# Patient Record
Sex: FEMALE | Race: WHITE | NOT HISPANIC OR LATINO | URBAN - METROPOLITAN AREA
[De-identification: names, ages, dates, MRNs, and addresses within clinical notes are randomized per-mention and may not be internally consistent; named-entity substitution may affect disease eponyms.]

---

## 2017-05-13 ENCOUNTER — GENERIC CONVERSION - ENCOUNTER (OUTPATIENT)
Dept: OTHER | Facility: OTHER | Age: 42
End: 2017-05-13

## 2017-06-08 ENCOUNTER — GENERIC CONVERSION - ENCOUNTER (OUTPATIENT)
Dept: OTHER | Facility: OTHER | Age: 42
End: 2017-06-08

## 2017-06-22 ENCOUNTER — GENERIC CONVERSION - ENCOUNTER (OUTPATIENT)
Dept: OTHER | Facility: OTHER | Age: 42
End: 2017-06-22

## 2017-07-10 ENCOUNTER — GENERIC CONVERSION - ENCOUNTER (OUTPATIENT)
Dept: OTHER | Facility: OTHER | Age: 42
End: 2017-07-10

## 2017-08-03 ENCOUNTER — GENERIC CONVERSION - ENCOUNTER (OUTPATIENT)
Dept: OTHER | Facility: OTHER | Age: 42
End: 2017-08-03

## 2017-08-14 ENCOUNTER — GENERIC CONVERSION - ENCOUNTER (OUTPATIENT)
Dept: OTHER | Facility: OTHER | Age: 42
End: 2017-08-14

## 2017-09-07 ENCOUNTER — GENERIC CONVERSION - ENCOUNTER (OUTPATIENT)
Dept: OTHER | Facility: OTHER | Age: 42
End: 2017-09-07

## 2017-09-11 ENCOUNTER — GENERIC CONVERSION - ENCOUNTER (OUTPATIENT)
Dept: OTHER | Facility: OTHER | Age: 42
End: 2017-09-11

## 2017-09-27 ENCOUNTER — GENERIC CONVERSION - ENCOUNTER (OUTPATIENT)
Dept: OTHER | Facility: OTHER | Age: 42
End: 2017-09-27

## 2019-05-24 ENCOUNTER — OFFICE VISIT (OUTPATIENT)
Dept: GASTROENTEROLOGY | Facility: CLINIC | Age: 44
End: 2019-05-24
Payer: COMMERCIAL

## 2019-05-24 VITALS
BODY MASS INDEX: 23.32 KG/M2 | HEIGHT: 65 IN | DIASTOLIC BLOOD PRESSURE: 78 MMHG | SYSTOLIC BLOOD PRESSURE: 108 MMHG | WEIGHT: 140 LBS | HEART RATE: 72 BPM

## 2019-05-24 DIAGNOSIS — R10.32 LEFT LOWER QUADRANT PAIN: Primary | ICD-10-CM

## 2019-05-24 DIAGNOSIS — Z80.0 FAMILY HISTORY OF COLON CANCER IN MOTHER: ICD-10-CM

## 2019-05-24 PROCEDURE — 99203 OFFICE O/P NEW LOW 30 MIN: CPT | Performed by: INTERNAL MEDICINE

## 2019-05-24 RX ORDER — NORETHINDRONE ACETATE/ETHINYL ESTRADIOL AND FERROUS FUMARATE 1.5-30(21)
KIT ORAL
Refills: 0 | COMMUNITY
Start: 2019-05-08

## 2019-05-24 RX ORDER — HYOSCYAMINE SULFATE 0.125 MG
0.12 TABLET ORAL EVERY 6 HOURS PRN
Qty: 30 TABLET | Refills: 2 | Status: SHIPPED | OUTPATIENT
Start: 2019-05-24 | End: 2019-08-27 | Stop reason: SDUPTHER

## 2019-05-24 RX ORDER — LEVOCETIRIZINE DIHYDROCHLORIDE 5 MG/1
5 TABLET, FILM COATED ORAL DAILY PRN
COMMUNITY

## 2019-05-24 RX ORDER — OMEGA-3 FATTY ACIDS/FISH OIL 300-1000MG
CAPSULE ORAL
COMMUNITY

## 2019-06-05 LAB
ALBUMIN SERPL-MCNC: 3.9 G/DL (ref 3.6–5.1)
ALBUMIN/GLOB SERPL: 1.4 (CALC) (ref 1–2.5)
ALP SERPL-CCNC: 32 U/L (ref 33–115)
ALT SERPL-CCNC: 10 U/L (ref 6–29)
AST SERPL-CCNC: 17 U/L (ref 10–35)
BASOPHILS # BLD AUTO: 38 CELLS/UL (ref 0–200)
BASOPHILS NFR BLD AUTO: 0.5 %
BILIRUB SERPL-MCNC: 0.5 MG/DL (ref 0.2–1.2)
BUN SERPL-MCNC: 9 MG/DL (ref 7–25)
BUN/CREAT SERPL: ABNORMAL (CALC) (ref 6–22)
CALCIUM SERPL-MCNC: 9.1 MG/DL (ref 8.6–10.2)
CHLORIDE SERPL-SCNC: 105 MMOL/L (ref 98–110)
CO2 SERPL-SCNC: 26 MMOL/L (ref 20–32)
CREAT SERPL-MCNC: 0.83 MG/DL (ref 0.5–1.1)
EOSINOPHIL # BLD AUTO: 90 CELLS/UL (ref 15–500)
EOSINOPHIL NFR BLD AUTO: 1.2 %
ERYTHROCYTE [DISTWIDTH] IN BLOOD BY AUTOMATED COUNT: 12 % (ref 11–15)
ERYTHROCYTE [SEDIMENTATION RATE] IN BLOOD BY WESTERGREN METHOD: 6 MM/H
GLIADIN IGA SER IA-ACNC: 3 U
GLIADIN IGA SER IA-ACNC: 6 U
GLOBULIN SER CALC-MCNC: 2.8 G/DL (CALC) (ref 1.9–3.7)
GLUCOSE SERPL-MCNC: 94 MG/DL (ref 65–99)
HCT VFR BLD AUTO: 39.9 % (ref 35–45)
HGB BLD-MCNC: 13.5 G/DL (ref 11.7–15.5)
IGA SERPL-MCNC: 283 MG/DL (ref 81–463)
LYMPHOCYTES # BLD AUTO: 1785 CELLS/UL (ref 850–3900)
LYMPHOCYTES NFR BLD AUTO: 23.8 %
MCH RBC QN AUTO: 32.3 PG (ref 27–33)
MCHC RBC AUTO-ENTMCNC: 33.8 G/DL (ref 32–36)
MCV RBC AUTO: 95.5 FL (ref 80–100)
MONOCYTES # BLD AUTO: 435 CELLS/UL (ref 200–950)
MONOCYTES NFR BLD AUTO: 5.8 %
NEUTROPHILS # BLD AUTO: 5153 CELLS/UL (ref 1500–7800)
NEUTROPHILS NFR BLD AUTO: 68.7 %
PLATELET # BLD AUTO: 293 THOUSAND/UL (ref 140–400)
PMV BLD REES-ECKER: 12.1 FL (ref 7.5–12.5)
POTASSIUM SERPL-SCNC: 4.1 MMOL/L (ref 3.5–5.3)
PROT SERPL-MCNC: 6.7 G/DL (ref 6.1–8.1)
RBC # BLD AUTO: 4.18 MILLION/UL (ref 3.8–5.1)
SL AMB EGFR AFRICAN AMERICAN: 99 ML/MIN/1.73M2
SL AMB EGFR NON AFRICAN AMERICAN: 85 ML/MIN/1.73M2
SODIUM SERPL-SCNC: 138 MMOL/L (ref 135–146)
TTG IGA SER-ACNC: <1 U/ML
TTG IGG SER-ACNC: 2 U/ML
WBC # BLD AUTO: 7.5 THOUSAND/UL (ref 3.8–10.8)

## 2019-08-27 ENCOUNTER — OFFICE VISIT (OUTPATIENT)
Dept: GASTROENTEROLOGY | Facility: CLINIC | Age: 44
End: 2019-08-27
Payer: COMMERCIAL

## 2019-08-27 VITALS
HEIGHT: 65 IN | SYSTOLIC BLOOD PRESSURE: 120 MMHG | BODY MASS INDEX: 22.82 KG/M2 | WEIGHT: 137 LBS | DIASTOLIC BLOOD PRESSURE: 80 MMHG | HEART RATE: 60 BPM

## 2019-08-27 DIAGNOSIS — Z80.0 FAMILY HISTORY OF COLON CANCER IN MOTHER: ICD-10-CM

## 2019-08-27 DIAGNOSIS — R10.32 LEFT LOWER QUADRANT PAIN: ICD-10-CM

## 2019-08-27 DIAGNOSIS — K58.0 IRRITABLE BOWEL SYNDROME WITH DIARRHEA: Primary | ICD-10-CM

## 2019-08-27 PROBLEM — K64.4 EXTERNAL HEMORRHOID: Status: ACTIVE | Noted: 2019-08-27

## 2019-08-27 PROCEDURE — 99213 OFFICE O/P EST LOW 20 MIN: CPT | Performed by: INTERNAL MEDICINE

## 2019-08-27 RX ORDER — HYOSCYAMINE SULFATE 0.125 MG
0.12 TABLET ORAL EVERY 6 HOURS PRN
Qty: 30 TABLET | Refills: 2 | Status: SHIPPED | OUTPATIENT
Start: 2019-08-27 | End: 2021-06-25 | Stop reason: SDUPTHER

## 2019-08-27 NOTE — PROGRESS NOTES
[id number] - Outpatient Follow-up Note  Luma Nathalia Toro 38 y o  female MRN: [id number]  Encounter: [id number]    ASSESSMENT AND PLAN:      1  Irritable bowel syndrome with diarrhea  Chronic issue  Worked up previously  Recent blood work all normal   Seems to be doing better with fiber daily and Levsin as needed  - continue fiber daily  - continue hyoscyamine (ANASPAZ,LEVSIN) 0 125 MG tablet; Take 1 tablet (0 125 mg total) by mouth every 6 (six) hours as needed for cramping  Dispense: 30 tablet; Refill: 2  2  Family history of colon cancer in mother  Last colonoscopy December 2015  Due for follow-up 2020      Followup Appointment:  6 months  ______________________________________________________________________    Chief Complaint   Patient presents with    Follow up to labs     HPI:  Patient returns today for follow-up  She reports she is doing relatively well with the fiber and the Levsin as needed  She is moving her bowels about twice a day  They are formed  She is taking Levsin about once a week only when the pain gets extreme  She denies any upper GI symptoms  Over the last several weeks he has had issues with pain and bleeding from hemorrhoid  She reports she has had chronic internal hemorrhoids which periodically flare with bleeding and popping out  This time it is more pain with only a small amount of bleeding  She feels like it externally rather than internally    She has been treating this with hemorrhoid cream and is starting to feel better    Historical Information   Past Medical History:   Diagnosis Date    External hemorrhoid 8/27/2019    Irritable bowel syndrome     Irritable bowel syndrome with diarrhea 8/27/2019     Past Surgical History:   Procedure Laterality Date    KNEE SURGERY      OVARIAN CYST REMOVAL       Social History     Substance and Sexual Activity   Alcohol Use Not on file     Social History     Substance and Sexual Activity Drug Use Not on file     Social History     Tobacco Use   Smoking Status Never Smoker   Smokeless Tobacco Never Used     Family History   Problem Relation Age of Onset    Colon cancer Mother     Multiple sclerosis Mother     Coronary artery disease Father     Brain cancer Family     Breast cancer Family     Colon cancer Family     Hypertension Family     Lung cancer Family          Current Outpatient Medications:     hyoscyamine (ANASPAZ,LEVSIN) 0 125 MG tablet    Ibuprofen (ADVIL) 200 MG CAPS    CLAY FE 1 5/30 1 5-30 MG-MCG tablet    levocetirizine (XYZAL) 5 MG tablet  Allergies   Allergen Reactions    Percocet  [Oxycodone-Acetaminophen] Nausea Only       10 Point REVIEW OF SYSTEMS IS OTHERWISE NEGATIVE  PHYSICAL EXAM:    Blood pressure 120/80, pulse 60, height 5' 5" (1 651 m), weight 62 1 kg (137 lb)  Body mass index is 22 8 kg/m²  General Appearance: NAD, cooperative, alert  Eyes: Anicteric, PERRLA, EOMI  ENT:  Normocephalic, atraumatic, normal mucosa  Neck:  Supple, symmetrical, trachea midline  Resp:  Clear to auscultation bilaterally; no rales, rhonchi or wheezing; respirations unlabored   CV:  S1 S2, Regular rate and rhythm; no murmur, rub, or gallop  GI:  Soft, non-tender, non-distended; normal bowel sounds; no masses, no organomegaly   Rectal:  Small nontender external hemorrhoid  :  Deferred   Musculoskeletal: No cyanosis, clubbing or edema  Normal ROM    Skin:  No jaundice, rashes, or lesions   Heme/Lymph: No palpable cervical lymphadenopathy  Psych: Normal affect, good eye contact  Neuro: No gross deficits, AAOx3    Lab Results:   Lab Results   Component Value Date    WBC 7 5 05/29/2019    HGB 13 5 05/29/2019    HCT 39 9 05/29/2019    MCV 95 5 05/29/2019     05/29/2019     Lab Results   Component Value Date    K 4 1 05/29/2019     05/29/2019    CO2 26 05/29/2019    BUN 9 05/29/2019    CREATININE 0 83 05/29/2019    CALCIUM 9 1 05/29/2019    AST 17 05/29/2019    ALT 10 05/29/2019    ALKPHOS 32 (L) 05/29/2019     Celiac panel negative  Sed rate 6  Radiology Results:   No results found

## 2019-08-27 NOTE — LETTER
August 27, 2019     Kenyetta Mckeon MD  [facility]    Patient: Raimundo Londono   YOB: 1975   Date of Visit: 8/27/2019       Dear Dr Lula Mccarty: Thank you for referring Carley Hein to me for evaluation  Below are my notes for this consultation  If you have questions, please do not hesitate to call me  I look forward to following your patient along with you  Sincerely,        Jose Barber MD        CC: No Recipients  Jose Barber MD  8/27/2019  5:11 PM  Sign at close encounter  [id number] - Outpatient Follow-up Note  Taylor Toro 38 y o  female MRN: [id number]  Encounter: [id number]    ASSESSMENT AND PLAN:      1  Irritable bowel syndrome with diarrhea  Chronic issue  Worked up previously  Recent blood work all normal   Seems to be doing better with fiber daily and Levsin as needed  - continue fiber daily  - continue hyoscyamine (ANASPAZ,LEVSIN) 0 125 MG tablet; Take 1 tablet (0 125 mg total) by mouth every 6 (six) hours as needed for cramping  Dispense: 30 tablet; Refill: 2  2  Family history of colon cancer in mother  Last colonoscopy December 2015  Due for follow-up 2020      Followup Appointment:  6 months  ______________________________________________________________________    Chief Complaint   Patient presents with    Follow up to labs     HPI:  Patient returns today for follow-up  She reports she is doing relatively well with the fiber and the Levsin as needed  She is moving her bowels about twice a day  They are formed  She is taking Levsin about once a week only when the pain gets extreme  She denies any upper GI symptoms  Over the last several weeks he has had issues with pain and bleeding from hemorrhoid  She reports she has had chronic internal hemorrhoids which periodically flare with bleeding and popping out    This time it is more pain with only a small amount of bleeding  She feels like it externally rather than internally  She has been treating this with hemorrhoid cream and is starting to feel better    Historical Information   Past Medical History:   Diagnosis Date    External hemorrhoid 8/27/2019    Irritable bowel syndrome     Irritable bowel syndrome with diarrhea 8/27/2019     Past Surgical History:   Procedure Laterality Date    KNEE SURGERY      OVARIAN CYST REMOVAL       Social History     Substance and Sexual Activity   Alcohol Use Not on file     Social History     Substance and Sexual Activity   Drug Use Not on file     Social History     Tobacco Use   Smoking Status Never Smoker   Smokeless Tobacco Never Used     Family History   Problem Relation Age of Onset    Colon cancer Mother     Multiple sclerosis Mother     Coronary artery disease Father     Brain cancer Family     Breast cancer Family     Colon cancer Family     Hypertension Family     Lung cancer Family          Current Outpatient Medications:     hyoscyamine (ANASPAZ,LEVSIN) 0 125 MG tablet    Ibuprofen (ADVIL) 200 MG CAPS    CLAY FE 1 5/30 1 5-30 MG-MCG tablet    levocetirizine (XYZAL) 5 MG tablet  Allergies   Allergen Reactions    Percocet  [Oxycodone-Acetaminophen] Nausea Only       10 Point REVIEW OF SYSTEMS IS OTHERWISE NEGATIVE  PHYSICAL EXAM:    Blood pressure 120/80, pulse 60, height 5' 5" (1 651 m), weight 62 1 kg (137 lb)  Body mass index is 22 8 kg/m²  General Appearance: NAD, cooperative, alert  Eyes: Anicteric, PERRLA, EOMI  ENT:  Normocephalic, atraumatic, normal mucosa  Neck:  Supple, symmetrical, trachea midline  Resp:  Clear to auscultation bilaterally; no rales, rhonchi or wheezing; respirations unlabored   CV:  S1 S2, Regular rate and rhythm; no murmur, rub, or gallop    GI:  Soft, non-tender, non-distended; normal bowel sounds; no masses, no organomegaly   Rectal:  Small nontender external hemorrhoid  :  Deferred   Musculoskeletal: No cyanosis, clubbing or edema  Normal ROM  Skin:  No jaundice, rashes, or lesions   Heme/Lymph: No palpable cervical lymphadenopathy  Psych: Normal affect, good eye contact  Neuro: No gross deficits, AAOx3    Lab Results:   Lab Results   Component Value Date    WBC 7 5 05/29/2019    HGB 13 5 05/29/2019    HCT 39 9 05/29/2019    MCV 95 5 05/29/2019     05/29/2019     Lab Results   Component Value Date    K 4 1 05/29/2019     05/29/2019    CO2 26 05/29/2019    BUN 9 05/29/2019    CREATININE 0 83 05/29/2019    CALCIUM 9 1 05/29/2019    AST 17 05/29/2019    ALT 10 05/29/2019    ALKPHOS 32 (L) 05/29/2019     Celiac panel negative  Sed rate 6  Radiology Results:   No results found

## 2019-09-18 ENCOUNTER — TELEPHONE (OUTPATIENT)
Dept: FAMILY MEDICINE CLINIC | Facility: CLINIC | Age: 44
End: 2019-09-18

## 2019-09-18 NOTE — LETTER
[address] [place] [address]    Dear Niki Odom  Records from Insurance indicate that you are a patient of PCP, with [facility], Kaykay Howard 41  Please call the office to establish care and schedule your annual physical today at [phone]  If you are seeing a primary care provider other than the one assigned to you by your insurance, you can simply call the number on the back of your insurance card to change your primary care physician with your insurance company  We thank you for choosing [address] for your healthcare needs      Sincerely,    [facility]

## 2020-01-28 NOTE — TELEPHONE ENCOUNTER
01/28/20 5:48 PM     Thank you for your request  Your request has been received, reviewed, and the patient chart updated  The PCP has successfully been removed with a patient attribution note  This message will now be completed      Thank you  Uri Vasquez

## 2020-02-05 ENCOUNTER — OFFICE VISIT (OUTPATIENT)
Dept: GASTROENTEROLOGY | Facility: CLINIC | Age: 45
End: 2020-02-05
Payer: COMMERCIAL

## 2020-02-05 VITALS
HEIGHT: 65 IN | WEIGHT: 142 LBS | DIASTOLIC BLOOD PRESSURE: 80 MMHG | SYSTOLIC BLOOD PRESSURE: 122 MMHG | BODY MASS INDEX: 23.66 KG/M2

## 2020-02-05 DIAGNOSIS — K58.0 IRRITABLE BOWEL SYNDROME WITH DIARRHEA: Primary | ICD-10-CM

## 2020-02-05 DIAGNOSIS — Z80.0 FAMILY HISTORY OF COLON CANCER IN MOTHER: ICD-10-CM

## 2020-02-05 PROCEDURE — 99213 OFFICE O/P EST LOW 20 MIN: CPT | Performed by: INTERNAL MEDICINE

## 2020-02-05 NOTE — PROGRESS NOTES
[id number] - Outpatient Follow-up Note  Cristina Toro 38 y o  female MRN: [id number]  Encounter: [id number]    ASSESSMENT AND PLAN:      1  Irritable bowel syndrome with diarrhea  Doing well on fiber and yogurt  Has Levsin to use as needed but has not taken it for many months  - continue fiber and yogurt daily  - can use Levsin as needed for lower quadrant abdominal pain    2  Family history of colon cancer in mother  Last colonoscopy December 2015  Due for follow-up 2020      Followup Appointment to years  ______________________________________________________________________    Chief Complaint   Patient presents with    Follow-up    Irritable Bowel Syndrome     HPI:  The patient returns today for follow-up  From irritable bowel standpoint she is doing well  She continues take fiber every day  She eats yogurt most days as well  She is moving her bowels once to twice a day  Her stools are formed  She denies any rectal bleeding  She reports some intermittent left lower quadrant abdominal pain  She uses the Levsin as needed but has not used it in many months     She denies any upper GI symptoms  Her weight is stable    Her hemorrhoid has not been a problem    Historical Information   Past Medical History:   Diagnosis Date    External hemorrhoid 8/27/2019    Irritable bowel syndrome     Irritable bowel syndrome with diarrhea 8/27/2019     Past Surgical History:   Procedure Laterality Date    KNEE SURGERY      OVARIAN CYST REMOVAL       Social History     Substance and Sexual Activity   Alcohol Use Not on file     Social History     Substance and Sexual Activity   Drug Use Not on file     Social History     Tobacco Use   Smoking Status Never Smoker   Smokeless Tobacco Never Used     Family History   Problem Relation Age of Onset    Colon cancer Mother     Multiple sclerosis Mother     Coronary artery disease Father     Brain cancer Family     Breast cancer Family     Colon cancer Family     Hypertension Family     Lung cancer Family          Current Outpatient Medications:     hyoscyamine (ANASPAZ,LEVSIN) 0 125 MG tablet    Ibuprofen (ADVIL) 200 MG CAPS    CLAY FE 1 5/30 1 5-30 MG-MCG tablet    levocetirizine (XYZAL) 5 MG tablet  Allergies   Allergen Reactions    Percocet  [Oxycodone-Acetaminophen] Nausea Only     Reviewed medications and allergies and updated as indicated    PHYSICAL EXAM:    Blood pressure 122/80, height 5' 5" (1 651 m), weight 64 4 kg (142 lb)  Body mass index is 23 63 kg/m²  General Appearance: NAD, cooperative, alert  Eyes: Anicteric, PERRLA, EOMI  ENT:  Normocephalic, atraumatic, normal mucosa  Neck:  Supple, symmetrical, trachea midline  Resp:  Clear to auscultation bilaterally; no rales, rhonchi or wheezing; respirations unlabored   CV:  S1 S2, Regular rate and rhythm; no murmur, rub, or gallop  GI:  Soft, non-tender, non-distended; normal bowel sounds; no masses, no organomegaly   Rectal: Deferred  Musculoskeletal: No cyanosis, clubbing or edema  Normal ROM  Skin:  No jaundice, rashes, or lesions   Heme/Lymph: No palpable cervical lymphadenopathy  Psych: Normal affect, good eye contact  Neuro: No gross deficits, AAOx3    Lab Results:   Lab Results   Component Value Date    WBC 7 5 05/29/2019    HGB 13 5 05/29/2019    HCT 39 9 05/29/2019    MCV 95 5 05/29/2019     05/29/2019     Lab Results   Component Value Date    K 4 1 05/29/2019     05/29/2019    CO2 26 05/29/2019    BUN 9 05/29/2019    CREATININE 0 83 05/29/2019    CALCIUM 9 1 05/29/2019    AST 17 05/29/2019    ALT 10 05/29/2019    ALKPHOS 32 (L) 05/29/2019     No results found for: IRON, TIBC, FERRITIN  No results found for: LIPASE    Radiology Results:   No results found

## 2020-02-05 NOTE — LETTER
February 5, 2020     Kevin Fisher MD  [facility]    Patient: Talib Rawls   YOB: 1975   Date of Visit: 2/5/2020       Dear Dr Eryn Loera: Thank you for referring Kayla Moulton to me for evaluation  Below are my notes for this consultation  If you have questions, please do not hesitate to call me  I look forward to following your patient along with you  Sincerely,        Ruthy Vick MD        CC: No Recipients  Ruthy Vick MD  2/5/2020  5:07 PM  Sign at close encounter  [facility] - Outpatient Follow-up Note  Talib Rawls 38 y o  female MRN: [id number]  Encounter: [id number]    ASSESSMENT AND PLAN:      1  Irritable bowel syndrome with diarrhea  Doing well on fiber and yogurt  Has Levsin to use as needed but has not taken it for many months  - continue fiber and yogurt daily  - can use Levsin as needed for lower quadrant abdominal pain    2  Family history of colon cancer in mother  Last colonoscopy December 2015  Due for follow-up 2020      Followup Appointment to years  ______________________________________________________________________    Chief Complaint   Patient presents with    Follow-up    Irritable Bowel Syndrome     HPI:  The patient returns today for follow-up  From irritable bowel standpoint she is doing well  She continues take fiber every day  She eats yogurt most days as well  She is moving her bowels once to twice a day  Her stools are formed  She denies any rectal bleeding  She reports some intermittent left lower quadrant abdominal pain  She uses the Levsin as needed but has not used it in many months     She denies any upper GI symptoms  Her weight is stable    Her hemorrhoid has not been a problem    Historical Information   Past Medical History:   Diagnosis Date    External hemorrhoid 8/27/2019    Irritable bowel syndrome     Irritable bowel syndrome with diarrhea 8/27/2019     Past Surgical History:   Procedure Laterality Date    KNEE SURGERY      OVARIAN CYST REMOVAL       Social History     Substance and Sexual Activity   Alcohol Use Not on file     Social History     Substance and Sexual Activity   Drug Use Not on file     Social History     Tobacco Use   Smoking Status Never Smoker   Smokeless Tobacco Never Used     Family History   Problem Relation Age of Onset    Colon cancer Mother     Multiple sclerosis Mother     Coronary artery disease Father     Brain cancer Family     Breast cancer Family     Colon cancer Family     Hypertension Family     Lung cancer Family          Current Outpatient Medications:     hyoscyamine (ANASPAZ,LEVSIN) 0 125 MG tablet    Ibuprofen (ADVIL) 200 MG CAPS    CLAY FE 1 5/30 1 5-30 MG-MCG tablet    levocetirizine (XYZAL) 5 MG tablet  Allergies   Allergen Reactions    Percocet  [Oxycodone-Acetaminophen] Nausea Only     Reviewed medications and allergies and updated as indicated    PHYSICAL EXAM:    Blood pressure 122/80, height 5' 5" (1 651 m), weight 64 4 kg (142 lb)  Body mass index is 23 63 kg/m²  General Appearance: NAD, cooperative, alert  Eyes: Anicteric, PERRLA, EOMI  ENT:  Normocephalic, atraumatic, normal mucosa  Neck:  Supple, symmetrical, trachea midline  Resp:  Clear to auscultation bilaterally; no rales, rhonchi or wheezing; respirations unlabored   CV:  S1 S2, Regular rate and rhythm; no murmur, rub, or gallop  GI:  Soft, non-tender, non-distended; normal bowel sounds; no masses, no organomegaly   Rectal: Deferred  Musculoskeletal: No cyanosis, clubbing or edema  Normal ROM    Skin:  No jaundice, rashes, or lesions   Heme/Lymph: No palpable cervical lymphadenopathy  Psych: Normal affect, good eye contact  Neuro: No gross deficits, AAOx3    Lab Results:   Lab Results   Component Value Date    WBC 7 5 05/29/2019    HGB 13 5 05/29/2019    HCT 39 9 05/29/2019    MCV 95 5 05/29/2019    PLT 293 05/29/2019     Lab Results   Component Value Date    K 4 1 05/29/2019     05/29/2019    CO2 26 05/29/2019    BUN 9 05/29/2019    CREATININE 0 83 05/29/2019    CALCIUM 9 1 05/29/2019    AST 17 05/29/2019    ALT 10 05/29/2019    ALKPHOS 32 (L) 05/29/2019     No results found for: IRON, TIBC, FERRITIN  No results found for: LIPASE    Radiology Results:   No results found

## 2021-04-13 DIAGNOSIS — Z23 ENCOUNTER FOR IMMUNIZATION: ICD-10-CM

## 2021-06-25 VITALS — HEIGHT: 65 IN | WEIGHT: 142 LBS | BODY MASS INDEX: 23.66 KG/M2

## 2021-06-25 DIAGNOSIS — R10.32 LEFT LOWER QUADRANT PAIN: ICD-10-CM

## 2021-06-25 DIAGNOSIS — Z80.0 FHX: COLON CANCER: Primary | ICD-10-CM

## 2021-06-25 RX ORDER — SODIUM PICOSULFATE, MAGNESIUM OXIDE, AND ANHYDROUS CITRIC ACID 10; 3.5; 12 MG/160ML; G/160ML; G/160ML
LIQUID ORAL
Qty: 320 ML | Refills: 0 | Status: SHIPPED | OUTPATIENT
Start: 2021-06-25 | End: 2021-07-12 | Stop reason: HOSPADM

## 2021-06-25 RX ORDER — HYOSCYAMINE SULFATE 0.125 MG
0.12 TABLET ORAL EVERY 6 HOURS PRN
Qty: 90 TABLET | Refills: 0 | Status: SHIPPED | OUTPATIENT
Start: 2021-06-25

## 2021-06-25 NOTE — TELEPHONE ENCOUNTER
Why does your doctor want you to have this procedure? Fhx colon ca-mother    Do you have kidney disease?  no  If yes, are you on dialysis :     Have you had diverticulitis within the past 2 months? no    Are you diabetic?  no  If yes, insulin dependent: If yes, provide diabetic instructions sheet     Do take iron supplements?  no  If yes, instruct patient to hold iron supplement for 7 days prior    Are you on a blood thinner? no   Was the blood thinner sheet complete and faxed to cardiologist no  Plavix (clopidogrel), Coumadin (warfarin), Lovenox (enoxaparin), Xarelto (rivaroxaban), Pradaxa(dabigatran), Eliquis(apixaban) Savaysa/Lixiana (edoxapan)    Do you have an automatic implantable cardiac defibrillator (AICD)/pacemaker ([facility])? no  Was AICD/pacemaker sheet completed and faxed to cardiologist? no    Are you on home oxygen? no  If yes, continuous or nocturnal:     Have you been treated for MRSA, VRE or any communicable diseases? no    Heart attack, stroke, or stent within 3 months? no  Schedule at Hospital if within 3-6 months   Use nitroglycerin for chest pain in the last 6 months? no    History of organ  transplant?  no   If yes, notify Endo      History of neck/throat/tongue surgery or cancer? no  IF yes, notify Endo      Any problems with anesthesia in the past? no     Was stool C diff ordered?  no Stool specimen needs to be completed prior to procedure    Do have any facial or body piercings?no     Do you have a latex allergy? no     Do have an allergy to metals? (Bravo study only) no     If pediatric patient, was consent faxed to pediatrician no     Patient rights reviewed yes     Colon phone prep completed; clenpiq instructions reviewed and sent to pt via [employer]; rx forwarded to provider

## 2021-07-11 ENCOUNTER — ANESTHESIA EVENT (OUTPATIENT)
Dept: GASTROENTEROLOGY | Facility: AMBULATORY SURGERY CENTER | Age: 46
End: 2021-07-11

## 2021-07-11 RX ORDER — SODIUM CHLORIDE 9 MG/ML
125 INJECTION, SOLUTION INTRAVENOUS CONTINUOUS
Status: CANCELLED | OUTPATIENT
Start: 2021-07-11

## 2021-07-11 NOTE — ANESTHESIA PREPROCEDURE EVALUATION
Procedure:  COLONOSCOPY    Relevant Problems   No relevant active problems      Hx IBS  Hemorrhoids    Physical Exam    Airway    Mallampati score: II  TM Distance: >3 FB  Neck ROM: full     Dental   No notable dental hx     Cardiovascular      Pulmonary      Other Findings        Anesthesia Plan  ASA Score- 2     Anesthesia Type- IV sedation with anesthesia with ASA Monitors  Additional Monitors:   Airway Plan:           Plan Factors-    Chart reviewed  Patient summary reviewed  Patient is not a current smoker  Patient did not smoke on day of surgery  Induction- intravenous  Postoperative Plan-     Informed Consent- Anesthetic plan and risks discussed with patient  I personally reviewed this patient with the CRNA  Discussed and agreed on the Anesthesia Plan with the CRNA  Calvin Leonard

## 2021-07-12 ENCOUNTER — HOSPITAL ENCOUNTER (OUTPATIENT)
Dept: GASTROENTEROLOGY | Facility: AMBULATORY SURGERY CENTER | Age: 46
Discharge: HOME | End: 2021-07-12
Payer: COMMERCIAL

## 2021-07-12 ENCOUNTER — ANESTHESIA (OUTPATIENT)
Dept: GASTROENTEROLOGY | Facility: AMBULATORY SURGERY CENTER | Age: 46
End: 2021-07-12

## 2021-07-12 VITALS
SYSTOLIC BLOOD PRESSURE: 116 MMHG | OXYGEN SATURATION: 100 % | DIASTOLIC BLOOD PRESSURE: 59 MMHG | HEART RATE: 55 BPM | RESPIRATION RATE: 19 BRPM | TEMPERATURE: 98.8 F

## 2021-07-12 DIAGNOSIS — Z80.0 FAMILY HISTORY OF COLON CANCER IN MOTHER: ICD-10-CM

## 2021-07-12 LAB
EXT PREGNANCY TEST URINE: NEGATIVE
EXT. CONTROL: NORMAL

## 2021-07-12 PROCEDURE — G0105 COLORECTAL SCRN; HI RISK IND: HCPCS | Performed by: INTERNAL MEDICINE

## 2021-07-12 RX ORDER — PROPOFOL 10 MG/ML
INJECTION, EMULSION INTRAVENOUS AS NEEDED
Status: DISCONTINUED | OUTPATIENT
Start: 2021-07-12 | End: 2021-07-12

## 2021-07-12 RX ORDER — SODIUM CHLORIDE, SODIUM LACTATE, POTASSIUM CHLORIDE, CALCIUM CHLORIDE 600; 310; 30; 20 MG/100ML; MG/100ML; MG/100ML; MG/100ML
50 INJECTION, SOLUTION INTRAVENOUS CONTINUOUS
Status: DISCONTINUED | OUTPATIENT
Start: 2021-07-12 | End: 2021-07-12

## 2021-07-12 RX ADMIN — SODIUM CHLORIDE, SODIUM LACTATE, POTASSIUM CHLORIDE, CALCIUM CHLORIDE 50 ML/HR: 600; 310; 30; 20 INJECTION, SOLUTION INTRAVENOUS at 13:45

## 2021-07-12 RX ADMIN — PROPOFOL 50 MG: 10 INJECTION, EMULSION INTRAVENOUS at 14:50

## 2021-07-12 RX ADMIN — SODIUM CHLORIDE, SODIUM LACTATE, POTASSIUM CHLORIDE, CALCIUM CHLORIDE 50 ML/HR: 600; 310; 30; 20 INJECTION, SOLUTION INTRAVENOUS at 14:15

## 2021-07-12 RX ADMIN — PROPOFOL 30 MG: 10 INJECTION, EMULSION INTRAVENOUS at 14:55

## 2021-07-12 RX ADMIN — PROPOFOL 120 MG: 10 INJECTION, EMULSION INTRAVENOUS at 14:39

## 2021-07-12 RX ADMIN — PROPOFOL 50 MG: 10 INJECTION, EMULSION INTRAVENOUS at 14:47

## 2021-07-12 RX ADMIN — PROPOFOL 50 MG: 10 INJECTION, EMULSION INTRAVENOUS at 14:45

## 2021-07-12 RX ADMIN — PROPOFOL 50 MG: 10 INJECTION, EMULSION INTRAVENOUS at 14:43

## 2021-07-12 RX ADMIN — PROPOFOL 50 MG: 10 INJECTION, EMULSION INTRAVENOUS at 14:52

## 2021-07-12 RX ADMIN — PROPOFOL 20 MG: 10 INJECTION, EMULSION INTRAVENOUS at 14:41

## 2021-07-12 NOTE — H&P
History and Physical - [facility]  Vance Toro 51 y o  female MRN: [id number]    HPI: Puneet Betancur is a 51y o  year old female who presents for increased risk screening colonoscopy secondary to mother with colon cancer    REVIEW OF SYSTEMS: Per the HPI, and otherwise unremarkable      Historical Information   Past Medical History:   Diagnosis Date    External hemorrhoid 8/27/2019    Irritable bowel syndrome     Irritable bowel syndrome with diarrhea 8/27/2019     Past Surgical History:   Procedure Laterality Date    CAPSULE ENDOSCOPY      COLONOSCOPY      KNEE SURGERY      OVARIAN CYST REMOVAL       Social History   Social History     Substance and Sexual Activity   Alcohol Use Yes    Comment: occassional     Social History     Substance and Sexual Activity   Drug Use Never     Social History     Tobacco Use   Smoking Status Never Smoker   Smokeless Tobacco Never Used     Family History   Problem Relation Age of Onset    Colon cancer Mother     Multiple sclerosis Mother     Coronary artery disease Father     Brain cancer Family     Breast cancer Family     Colon cancer Family     Hypertension Family     Lung cancer Family        Meds/Allergies       Current Outpatient Medications:     hyoscyamine (ANASPAZ,LEVSIN) 0 125 MG tablet    Ibuprofen (ADVIL) 200 MG CAPS    CLAY FE 1 5/30 1 5-30 MG-MCG tablet    levocetirizine (XYZAL) 5 MG tablet    Sod Picosulfate-Mag Ox-Cit Acd (Clenpiq) 10-3 5-12 MG-GM -GM/160ML SOLN    Current Facility-Administered Medications:     lactated ringers infusion, 50 mL/hr, Intravenous, Continuous, Continue from Pre-op at 07/12/21 1428    Allergies   Allergen Reactions    Percocet  [Oxycodone-Acetaminophen] Nausea Only       Objective     /62   Pulse 66   Temp 98 8 °F (37 1 °C) (Temporal)   Resp (!) 25   LMP 07/05/2021   SpO2 100%     PHYSICAL EXAM    Gen: NAD AAOx3  Head: Normocephalic, Atraumatic  CV: S1S2 RRR no m/r/g  CHEST: Clear b/l no c/r/w  ABD: soft, +BS NT/ND  EXT: no edema    ASSESSMENT/PLAN:  This is a 51y o  year old female here for increased risk screening colonoscopy secondary to family history of colon cancer, and she is stable and optimized for her procedure

## 2021-07-12 NOTE — DISCHARGE INSTRUCTIONS
Hemorrhoids   WHAT YOU NEED TO KNOW:   What are hemorrhoids? Hemorrhoids are swollen blood vessels inside your rectum (internal hemorrhoids) or on your anus (external hemorrhoids)  Sometimes a hemorrhoid may prolapse  This means it extends out of your anus  What increases my risk for hemorrhoids? · Pregnancy or obesity    · Straining or sitting for a long time during bowel movements    · Liver disease    · Weak muscles around the anus caused by older age, rectal surgery, or anal intercourse    · A lack of physical activity    · Chronic diarrhea or constipation    · A low-fiber diet    What are the signs and symptoms of hemorrhoids? · Pain or itching around your anus or inside your rectum    · Swelling or bumps around your anus    · Bright red blood in your bowel movement, on the toilet paper, or in the toilet bowl    · Tissue bulging out of your anus (prolapsed hemorrhoids)    · Incontinence (poor control over urine or bowel movements)    How are hemorrhoids diagnosed? Your healthcare provider will ask about your symptoms, the foods you eat, and your bowel movements  He or she will examine your anus for external hemorrhoids  You may need the following:  · A digital rectal exam  is a test to check for hemorrhoids  Your healthcare provider will put a gloved finger inside your anus to feel for the hemorrhoids  · An anoscopy  is a test that uses a scope (small tube with a light and camera on the end) to look at your hemorrhoids  How are hemorrhoids treated? Treatment will depend on your symptoms  You may need any of the following:  · Medicines  can help decrease pain and swelling, and soften your bowel movement  The medicine may be a pill, pad, cream, or ointment  · Procedures  may be used to shrink or remove your hemorrhoid  Examples include rubber-band ligation, sclerotherapy, and photocoagulation  These procedures may be done in your healthcare provider's office   Ask your healthcare provider for more information about these procedures  · Surgery  may be needed to shrink or remove your hemorrhoids  How can I manage my symptoms? · Apply ice on your anus for 15 to 20 minutes every hour or as directed  Use an ice pack, or put crushed ice in a plastic bag  Cover it with a towel before you apply it to your anus  Ice helps prevent tissue damage and decreases swelling and pain  · Take a sitz bath  Fill a bathtub with 4 to 6 inches of warm water  You may also use a sitz bath pan that fits inside a toilet bowl  Sit in the sitz bath for 15 minutes  Do this 3 times a day, and after each bowel movement  The warm water can help decrease pain and swelling  · Keep your anal area clean  Gently wash the area with warm water daily  Soap may irritate the area  After a bowel movement, wipe with moist towelettes or wet toilet paper  Dry toilet paper can irritate the area  How can I help prevent hemorrhoids? · Do not strain to have a bowel movement  Do not sit on the toilet too long  These actions can increase pressure on the tissues in your rectum and anus  · Drink plenty of liquids  Liquids can help prevent constipation  Ask how much liquid to drink each day and which liquids are best for you  · Eat a variety of high-fiber foods  Examples include fruits, vegetables, and whole grains  Ask your healthcare provider how much fiber you need each day  You may need to take a fiber supplement  · Exercise as directed  Exercise, such as walking, may make it easier to have a bowel movement  Ask your healthcare provider to help you create an exercise plan  · Do not have anal sex  Anal sex can weaken the skin around your rectum and anus  · Avoid heavy lifting  This can cause straining and increase your risk for another hemorrhoid  When should I seek immediate care? · You have severe pain in your rectum or around your anus      · You have severe pain in your abdomen and you are vomiting  · You have bleeding from your anus that soaks through your underwear  When should I contact my healthcare provider? · You have frequent and painful bowel movements  · Your hemorrhoid looks or feels more swollen than usual      · You do not have a bowel movement for 2 days or more  · You see or feel tissue coming through your anus  · You have questions or concerns about your condition or care  CARE AGREEMENT:   You have the right to help plan your care  Learn about your health condition and how it may be treated  Discuss treatment options with your healthcare providers to decide what care you want to receive  You always have the right to refuse treatment  The above information is an  only  It is not intended as medical advice for individual conditions or treatments  Talk to your doctor, nurse or pharmacist before following any medical regimen to see if it is safe and effective for you  © Copyright [address] Information is for End User's use only and may not be sold, redistributed or otherwise used for commercial purposes  All illustrations and images included in CareNotes® are the copyrighted property of [employer]  or [address] [place] [address]  Colonoscopy   WHAT YOU NEED TO KNOW:   A colonoscopy is a procedure to examine the inside of your colon (intestine) with a scope  Polyps or tissue growths may have been removed during your colonoscopy  It is normal to feel bloated and to have some abdominal discomfort  You should be passing gas  If you have hemorrhoids or you had polyps removed, you may have a small amount of bleeding  DISCHARGE INSTRUCTIONS:   Seek care immediately if:    You have sudden, severe abdominal pain   You have problems swallowing   You have a large amount of black, sticky bowel movements or blood in your bowel movements   You have sudden trouble breathing        You feel weak, lightheaded, or faint or your heart beats faster than normal for you  Contact your healthcare provider if:    You have a fever and chills   You have nausea or are vomiting   Your abdomen is bloated or feels full and hard   You have abdominal pain   You have black, sticky bowel movements or blood in your bowel movements   You have not had a bowel movement for 3 days after your procedure   You have rash or hives   You have questions or concerns about your procedure  Activity:    Do not lift, strain, or run for 24 hours after your procedure   Rest after your procedure  You have been given medicine to relax you  Do not drive or make important decisions until the day after your procedure  Return to your normal activity as directed   Relieve gas and discomfort from bloating by lying on your right side with a heating pad on your abdomen  You may need to take short walks to help the gas move out  Eat small meals until bloating is relieved  Follow up with your healthcare provider as directed: Write down your questions so you remember to ask them during your visits  If you take a blood thinner, please review the specific instructions from your endoscopist about when you should resume it  These can be found in the Recommendation and Your Medication list sections of this After Visit Summary

## 2021-07-12 NOTE — ANESTHESIA POSTPROCEDURE EVALUATION
Post-Op Assessment Note    CV Status:  Stable    Pain management: adequate     Mental Status:  Alert, awake and sleepy   Hydration Status:  Euvolemic   PONV Controlled:  Controlled   Airway Patency:  Patent      Post Op Vitals Reviewed: Yes      Staff: CRNA, Anesthesiologist         No complications documented      BP   93/45   Temp      Pulse  54   Resp  18   SpO2   98

## 2022-07-27 ENCOUNTER — NURSE TRIAGE (OUTPATIENT)
Dept: OTHER | Facility: OTHER | Age: 47
End: 2022-07-27

## 2022-07-27 NOTE — TELEPHONE ENCOUNTER
Patient calling in with mild covid symptoms  Home care advice given per protocol and advised patient to call back to present to a nearby UC if her symptoms worsen  Reason for Disposition   [1] COVID-19 diagnosed by positive lab test (e g , PCR, rapid self-test kit) AND [2] mild symptoms (e g , cough, fever, others) AND [1] no complications or SOB    Answer Assessment - Initial Assessment Questions  Were you within 6 feet or less, for up to 15 minutes or more with a person that has a confirmed COVID-19 test? Yes- sister   What was the date of your exposure? Sunday   Are you experiencing any symptoms attributed to the virus?  (Assess for SOB, cough, fever, difficulty breathing) cough, chest tightness, joint aches    HIGH RISK: Do you have any history heart or lung conditions, weakened immune system, diabetes, Asthma, CHF, HIV, COPD, Chemo, renal failure, sickle cell, etc? none     PREGNANCY: Are you pregnant or did you recently give birth?  No     VACCINE: "Have you gotten the COVID-19 vaccine?" If Yes ask: "Which one, how many shots, when did you get it?" fully vaccinated and boosted- pfizer    Protocols used: CORONAVIRUS (COVID-19) DIAGNOSED OR SUSPECTED-ADULT-[place]

## 2022-07-27 NOTE — TELEPHONE ENCOUNTER
Regarding: medication covid  ----- Message from [facility] sent at 7/27/2022  8:34 AM EDT -----  "I have covid is there medication I can take" 1

## 2024-04-30 ENCOUNTER — HOSPITAL ENCOUNTER (OUTPATIENT)
Dept: HOSPITAL 99 - WDC | Age: 49
End: 2024-04-30
Payer: COMMERCIAL

## 2024-04-30 DIAGNOSIS — Z12.31: Primary | ICD-10-CM

## 2025-05-02 ENCOUNTER — HOSPITAL ENCOUNTER (OUTPATIENT)
Dept: HOSPITAL 99 - WDC | Age: 50
End: 2025-05-02
Payer: COMMERCIAL

## 2025-05-02 DIAGNOSIS — Z12.31: Primary | ICD-10-CM
